# Patient Record
Sex: FEMALE | URBAN - METROPOLITAN AREA
[De-identification: names, ages, dates, MRNs, and addresses within clinical notes are randomized per-mention and may not be internally consistent; named-entity substitution may affect disease eponyms.]

---

## 2024-11-08 ENCOUNTER — TELEPHONE (OUTPATIENT)
Dept: OBSTETRICS AND GYNECOLOGY | Facility: CLINIC | Age: 24
End: 2024-11-08
Payer: COMMERCIAL

## 2024-11-08 NOTE — TELEPHONE ENCOUNTER
----- Message from Laurent sent at 11/8/2024  9:54 AM CST -----  Contact: Self  Type:  Sooner Appointment Request    Caller is requesting a sooner appointment.  Caller declined first available appointment listed below.  Caller will not accept being placed on the waitlist and is requesting a message be sent to doctor.    Name of Caller:  Patient  When is the first available appointment?  NA  Symptoms:  NA  Would the patient rather a call back or a response via [facility]? Call Back  Best Call Back Number:  [phone]  Additional Information:  Patient is requesting a call back to be seen to establish care and consult about IUD

## 2025-05-19 ENCOUNTER — OFFICE VISIT (OUTPATIENT)
Dept: OBSTETRICS AND GYNECOLOGY | Facility: CLINIC | Age: 25
End: 2025-05-19
Payer: COMMERCIAL

## 2025-05-19 ENCOUNTER — RESULTS FOLLOW-UP (OUTPATIENT)
Dept: OBSTETRICS AND GYNECOLOGY | Facility: CLINIC | Age: 25
End: 2025-05-19

## 2025-05-19 ENCOUNTER — LAB VISIT (OUTPATIENT)
Dept: LAB | Facility: HOSPITAL | Age: 25
End: 2025-05-19
Attending: OBSTETRICS & GYNECOLOGY
Payer: COMMERCIAL

## 2025-05-19 VITALS
HEIGHT: 63 IN | WEIGHT: 132 LBS | SYSTOLIC BLOOD PRESSURE: 108 MMHG | BODY MASS INDEX: 23.39 KG/M2 | DIASTOLIC BLOOD PRESSURE: 68 MMHG

## 2025-05-19 DIAGNOSIS — N89.8 VAGINAL ODOR: ICD-10-CM

## 2025-05-19 DIAGNOSIS — Z01.419 ENCOUNTER FOR ANNUAL ROUTINE GYNECOLOGICAL EXAMINATION: Primary | ICD-10-CM

## 2025-05-19 DIAGNOSIS — N30.01 ACUTE CYSTITIS WITH HEMATURIA: ICD-10-CM

## 2025-05-19 DIAGNOSIS — Z72.51 HIGH RISK HETEROSEXUAL BEHAVIOR: ICD-10-CM

## 2025-05-19 DIAGNOSIS — R30.0 DYSURIA: ICD-10-CM

## 2025-05-19 DIAGNOSIS — Z12.4 CERVICAL CANCER SCREENING: ICD-10-CM

## 2025-05-19 DIAGNOSIS — Z11.3 ROUTINE SCREENING FOR STI (SEXUALLY TRANSMITTED INFECTION): ICD-10-CM

## 2025-05-19 LAB
AMM URATE CRY URNS QL MICRO: ABNORMAL
AMORPH CRY URNS QL MICRO: ABNORMAL
BACTERIA #/AREA URNS HPF: ABNORMAL /HPF
BILIRUB UR QL STRIP.AUTO: NEGATIVE
CA CARBONATE CRY URNS QL MICRO: ABNORMAL
CA PHOS CRY URNS QL MICRO: ABNORMAL
CAOX CRY URNS QL MICRO: ABNORMAL
CLARITY UR: CLEAR
COLOR UR AUTO: YELLOW
EPITH CASTS #/AREA URNS LPF: 0 /LPF (ref ?–0)
FATTY CASTS #/AREA URNS LPF: 0 /LPF (ref ?–0)
GLUCOSE UR QL STRIP: NEGATIVE
GRAN CASTS #/AREA URNS LPF: 0 /LPF (ref ?–0)
HGB UR QL STRIP: ABNORMAL
HOLD SPECIMEN: NORMAL
HYALINE CASTS #/AREA URNS LPF: 0 /LPF (ref 0–1)
KETONES UR QL STRIP: NEGATIVE
LEUKOCYTE ESTERASE UR QL STRIP: ABNORMAL
MICROSCOPIC COMMENT: ABNORMAL
NITRITE UR QL STRIP: POSITIVE
NON-SQ EPI CELLS #/AREA URNS HPF: 0 /HPF
OTHER ELEMENTS URNS MICRO: ABNORMAL
PH UR STRIP: 6 [PH]
PROT UR QL STRIP: NEGATIVE
RBC #/AREA URNS HPF: 0 /HPF (ref 0–4)
RBC CASTS #/AREA URNS LPF: 0 /LPF (ref ?–0)
SP GR UR STRIP: 1.02
SQUAMOUS #/AREA URNS HPF: 0 /HPF
TRI-PHOS CRY URNS QL MICRO: ABNORMAL
TRICHOMONAS UR QL MICRO: ABNORMAL /HPF
UNIDENT CRYS URNS QL MICRO: ABNORMAL
URATE CRY URNS QL MICRO: ABNORMAL
UROBILINOGEN UR STRIP-ACNC: NEGATIVE EU/DL
WAXY CASTS #/AREA URNS LPF: 0 /LPF (ref ?–0)
WBC #/AREA URNS HPF: 15 /HPF (ref 0–5)
WBC CASTS #/AREA URNS LPF: 0 /LPF (ref ?–0)
WBC CLUMPS URNS QL MICRO: ABNORMAL
YEAST URNS QL MICRO: ABNORMAL /HPF

## 2025-05-19 PROCEDURE — 81515 NFCT DS BV&VAGINITIS DNA ALG: CPT | Performed by: OBSTETRICS & GYNECOLOGY

## 2025-05-19 PROCEDURE — 81003 URINALYSIS AUTO W/O SCOPE: CPT

## 2025-05-19 PROCEDURE — 88175 CYTOPATH C/V AUTO FLUID REDO: CPT | Mod: TC | Performed by: OBSTETRICS & GYNECOLOGY

## 2025-05-19 PROCEDURE — 99999 PR PBB SHADOW E&M-EST. PATIENT-LVL III: CPT | Mod: PBBFAC,,, | Performed by: OBSTETRICS & GYNECOLOGY

## 2025-05-19 PROCEDURE — 87186 SC STD MICRODIL/AGAR DIL: CPT

## 2025-05-19 PROCEDURE — 87491 CHLMYD TRACH DNA AMP PROBE: CPT | Performed by: OBSTETRICS & GYNECOLOGY

## 2025-05-19 RX ORDER — AMITRIPTYLINE HYDROCHLORIDE 50 MG/1
TABLET, FILM COATED ORAL
COMMUNITY

## 2025-05-19 RX ORDER — TIMOLOL MALEATE 5 MG/ML
SOLUTION/ DROPS OPHTHALMIC
COMMUNITY
End: 2025-05-19

## 2025-05-19 RX ORDER — NAPROXEN 500 MG/1
500 TABLET ORAL 2 TIMES DAILY
COMMUNITY

## 2025-05-19 RX ORDER — FERROUS SULFATE 325(65) MG
TABLET ORAL
COMMUNITY

## 2025-05-19 RX ORDER — DROSPIRENONE AND ETHINYL ESTRADIOL 0.02-3(28)
1 KIT ORAL DAILY
Qty: 90 TABLET | Refills: 4 | Status: SHIPPED | OUTPATIENT
Start: 2025-05-19 | End: 2026-05-19

## 2025-05-19 RX ORDER — SUMATRIPTAN SUCCINATE 100 MG/1
TABLET ORAL
COMMUNITY
Start: 2025-04-07

## 2025-05-19 NOTE — PROGRESS NOTES
"Annual Well Woman's Exam  History & Physical      SUBJECTIVE:     History of Present Illness:  Patient is a 24 y.o. female presents for her annual exam. She would like to change her OCPs. Reports her current ones have caused weight gain.     She also reports that her urine has a foul odor and she would like to be tested for UTI.     Chief Complaint   Patient presents with    [facility] [place] [facility]       Review of patient's allergies indicates:  No Known Allergies    Current Medications[1]  OB History    No obstetric history on file.     No LMP recorded. (Menstrual status: Birth Control).      History reviewed. No pertinent past medical history.  Past Surgical History:   Procedure Laterality Date    tonsilectomy       Family History   Problem Relation Name Age of Onset    Breast cancer Maternal Grandmother      Hypertension Mother       Social History[2]     OBJECTIVE:     Vital Signs (Most Recent)  BP: 108/68 (05/19/25 1316)  5' 3" (1.6 m)  59.9 kg (132 lb)     Physical Exam:  Physical Exam  Vitals reviewed.   Constitutional:       Appearance: Normal appearance.   HENT:      Head: Normocephalic.   Pulmonary:      Effort: Pulmonary effort is normal.   Abdominal:      General: Abdomen is flat.      Palpations: Abdomen is soft.   Genitourinary:     General: Normal vulva.      Vagina: Normal.      Cervix: Normal.      Uterus: Normal.       Adnexa: Right adnexa normal and left adnexa normal.   Skin:     General: Skin is warm and dry.   Neurological:      General: No focal deficit present.      Mental Status: She is alert and oriented to person, place, and time.   Psychiatric:         Mood and Affect: Mood normal.         Behavior: Behavior normal.         ASSESSMENT/PLAN:       ICD-10-CM ICD-9-CM   1. Encounter for annual routine gynecological examination  Z01.419 V72.31   2. Routine screening for STI (sexually transmitted infection)  Z11.3 V74.5   3. Dysuria  R30.0 788.1   4. Vaginal odor  N89.8 625.8   5. Cervical cancer " screening  Z12.4 V76.2   6. High risk heterosexual behavior  Z72.51 V69.2       PLAN:    --Pap today  --GC/ Chl screening and vaginosis panel ordered  --Urine dip pos for UTI. Macrobid ordered.   --Contraception: will switch OCPs to Kathryn. Pt instructed to use back up birth control the first 7 days  --Follow up in 1 year for annual exam or sooner as needed    Morena Harrison MD, FACOG   Gynecology    [address] [id number] [phone]                    [1]   Current Outpatient Medications   Medication Sig Dispense Refill    amitriptyline (ELAVIL) 50 MG tablet       ferrous sulfate (IRON) 325 mg (65 mg iron) Tab tablet       naproxen (EC NAPROSYN) 500 MG EC tablet Take 500 mg by mouth 2 (two) times daily.      sumatriptan (IMITREX) 100 MG tablet Take by mouth.      VIENVA 0.1-20 mg-mcg per tablet        No current facility-administered medications for this visit.   [2]   Social History  Tobacco Use    Smoking status: Never    Smokeless tobacco: Never   Substance Use Topics    Alcohol use: Yes    Drug use: Never

## 2025-05-20 ENCOUNTER — TELEPHONE (OUTPATIENT)
Dept: OBSTETRICS AND GYNECOLOGY | Facility: CLINIC | Age: 25
End: 2025-05-20
Payer: COMMERCIAL

## 2025-05-20 NOTE — TELEPHONE ENCOUNTER
----- Message from Willi sent at 5/20/2025 10:04 AM CDT -----  Type: Needs Medical AdviceWho Called:  [facility] Call Back Number: [phone] Information: pt is calling the office to to see when her Rx for Macrobid is supposed to be sent in as she thought the Dr said yesterday but the pharmacy does not have it. Please call back to advise. Thanks![facility]/pharmacy #[id number] - [place], [place] - [address] [facility] [place] [address]: [phone] Fax: [phone]  ----- Message -----  From: Willi Johnson  Sent: 5/20/2025  10:05 AM CDT  To: Hunter BELTRÁN Staff    Type: Needs Medical AdviceWho Called:  [facility] Call Back Number: [phone] Information: pt is calling the office to to see when her Rx for Macrobid is supposed to be sent in as she thought the Dr said yesterday but the pharmacy does not have it. Please call back to advise. Thanks!

## 2025-05-22 LAB
BACTERIA UR CULT: ABNORMAL
BACTERIAL VAGINOSIS DNA (OHS): NOT DETECTED
C TRACH DNA SPEC QL NAA+PROBE: NOT DETECTED
CANDIDA GLABRATA/KRUSEI DNA (OHS): NOT DETECTED
CANDIDA SPECIES DNA (OHS): NOT DETECTED
CTGC SOURCE (OHS) ORD-325: NORMAL
N GONORRHOEA DNA UR QL NAA+PROBE: NOT DETECTED
TRICHOMONAS VAGINALIS DNA (OHS): NOT DETECTED

## 2025-05-23 LAB
INSULIN SERPL-ACNC: NORMAL U[IU]/ML
LAB AP BETHESDA CATEGORY: NORMAL
LAB AP CLINICAL FINDINGS: NORMAL
LAB AP CONTRACEPTIVES: NORMAL
LAB AP GYN ADDITIONAL FINDINGS: NORMAL
LAB AP OCHS PAP SPECIMEN ADEQUACY: NORMAL
LAB AP OHS PAP INTERPRETATION: NORMAL
LAB AP PAP DISCLAIMER COMMENTS: NORMAL
LAB AP PAP ESTROGEN REPLACEMENT THERAPY: NORMAL
LAB AP PAP PMP: NORMAL
LAB AP PAP PREVIOUS BX: NORMAL
LAB AP PAP PRIOR TREATMENT: NORMAL
LAB AP PERFORMING LOCATION(S): NORMAL

## 2025-06-02 ENCOUNTER — PATIENT MESSAGE (OUTPATIENT)
Dept: OBSTETRICS AND GYNECOLOGY | Facility: CLINIC | Age: 25
End: 2025-06-02
Payer: COMMERCIAL

## 2025-06-09 RX ORDER — NITROFURANTOIN 25; 75 MG/1; MG/1
100 CAPSULE ORAL DAILY PRN
Qty: 30 CAPSULE | Refills: 11 | Status: SHIPPED | OUTPATIENT
Start: 2025-06-09

## 2025-06-11 ENCOUNTER — PATIENT MESSAGE (OUTPATIENT)
Dept: OBSTETRICS AND GYNECOLOGY | Facility: CLINIC | Age: 25
End: 2025-06-11
Payer: COMMERCIAL